# Patient Record
Sex: FEMALE | Race: WHITE | NOT HISPANIC OR LATINO | Employment: UNEMPLOYED | URBAN - NONMETROPOLITAN AREA
[De-identification: names, ages, dates, MRNs, and addresses within clinical notes are randomized per-mention and may not be internally consistent; named-entity substitution may affect disease eponyms.]

---

## 2023-09-18 LAB
ALANINE AMINOTRANSFERASE (SGPT) (U/L) IN SER/PLAS: 24 U/L (ref 7–45)
ALBUMIN (G/DL) IN SER/PLAS: 4 G/DL (ref 3.4–5)
ALKALINE PHOSPHATASE (U/L) IN SER/PLAS: 54 U/L (ref 33–110)
ANION GAP IN SER/PLAS: 17 MMOL/L (ref 10–20)
ASPARTATE AMINOTRANSFERASE (SGOT) (U/L) IN SER/PLAS: 23 U/L (ref 9–39)
BILIRUBIN TOTAL (MG/DL) IN SER/PLAS: 0.3 MG/DL (ref 0–1.2)
CALCIUM (MG/DL) IN SER/PLAS: 9.1 MG/DL (ref 8.6–10.3)
CARBON DIOXIDE, TOTAL (MMOL/L) IN SER/PLAS: 25 MMOL/L (ref 21–32)
CHLORIDE (MMOL/L) IN SER/PLAS: 100 MMOL/L (ref 98–107)
CHOLESTEROL (MG/DL) IN SER/PLAS: 151 MG/DL (ref 0–199)
CHOLESTEROL IN HDL (MG/DL) IN SER/PLAS: 45 MG/DL
CHOLESTEROL/HDL RATIO: 3.4
CREATININE (MG/DL) IN SER/PLAS: 0.46 MG/DL (ref 0.5–1.05)
ERYTHROCYTE DISTRIBUTION WIDTH (RATIO) BY AUTOMATED COUNT: 14.2 % (ref 11.5–14.5)
ERYTHROCYTE MEAN CORPUSCULAR HEMOGLOBIN CONCENTRATION (G/DL) BY AUTOMATED: 30.3 G/DL (ref 32–36)
ERYTHROCYTE MEAN CORPUSCULAR VOLUME (FL) BY AUTOMATED COUNT: 83 FL (ref 80–100)
ERYTHROCYTES (10*6/UL) IN BLOOD BY AUTOMATED COUNT: 4.55 X10E12/L (ref 4–5.2)
ESTIMATED AVERAGE GLUCOSE FOR HBA1C: 143 MG/DL
GFR FEMALE: >90 ML/MIN/1.73M2
GLUCOSE (MG/DL) IN SER/PLAS: 154 MG/DL (ref 74–99)
HEMATOCRIT (%) IN BLOOD BY AUTOMATED COUNT: 37.6 % (ref 36–46)
HEMOGLOBIN (G/DL) IN BLOOD: 11.4 G/DL (ref 12–16)
HEMOGLOBIN A1C/HEMOGLOBIN TOTAL IN BLOOD: 6.6 %
HEPATITIS C VIRUS AB PRESENCE IN SERUM: NONREACTIVE
HIV 1/ 2 AG/AB SCREEN: NONREACTIVE
LDL: 77 MG/DL (ref 0–99)
LEUKOCYTES (10*3/UL) IN BLOOD BY AUTOMATED COUNT: 10.7 X10E9/L (ref 4.4–11.3)
MAGNESIUM (MG/DL) IN SER/PLAS: 1.95 MG/DL (ref 1.6–2.4)
PHOSPHATE (MG/DL) IN SER/PLAS: 4.3 MG/DL (ref 2.5–4.9)
PLATELETS (10*3/UL) IN BLOOD AUTOMATED COUNT: 385 X10E9/L (ref 150–450)
POTASSIUM (MMOL/L) IN SER/PLAS: 4.1 MMOL/L (ref 3.5–5.3)
PROTEIN TOTAL: 7 G/DL (ref 6.4–8.2)
SODIUM (MMOL/L) IN SER/PLAS: 138 MMOL/L (ref 136–145)
THYROTROPIN (MIU/L) IN SER/PLAS BY DETECTION LIMIT <= 0.05 MIU/L: 0.02 MIU/L (ref 0.44–3.98)
THYROXINE (T4) FREE (NG/DL) IN SER/PLAS: 0.81 NG/DL (ref 0.61–1.12)
TRIGLYCERIDE (MG/DL) IN SER/PLAS: 146 MG/DL (ref 0–149)
TRIIODOTHYRONINE (T3) (NG/DL) IN SER/PLAS: 194 NG/DL (ref 60–200)
UREA NITROGEN (MG/DL) IN SER/PLAS: 11 MG/DL (ref 6–23)
VLDL: 29 MG/DL (ref 0–40)

## 2023-10-02 ENCOUNTER — HOSPITAL ENCOUNTER (OUTPATIENT)
Dept: RADIOLOGY | Facility: HOSPITAL | Age: 43
Discharge: HOME | End: 2023-10-02
Payer: COMMERCIAL

## 2023-10-02 DIAGNOSIS — R59.0 LOCALIZED ENLARGED LYMPH NODES: ICD-10-CM

## 2023-10-02 PROCEDURE — 70491 CT SOFT TISSUE NECK W/DYE: CPT

## 2023-10-02 PROCEDURE — 70491 CT SOFT TISSUE NECK W/DYE: CPT | Performed by: RADIOLOGY

## 2023-10-02 PROCEDURE — 2550000001 HC RX 255 CONTRASTS: Performed by: RADIOLOGY

## 2023-10-02 RX ADMIN — IOHEXOL 90 ML: 350 INJECTION, SOLUTION INTRAVENOUS at 08:47

## 2023-10-16 ENCOUNTER — HOSPITAL ENCOUNTER (EMERGENCY)
Facility: HOSPITAL | Age: 43
Discharge: HOME | End: 2023-10-16
Attending: EMERGENCY MEDICINE
Payer: COMMERCIAL

## 2023-10-16 VITALS
BODY MASS INDEX: 38.14 KG/M2 | HEIGHT: 67 IN | WEIGHT: 243 LBS | TEMPERATURE: 97.9 F | DIASTOLIC BLOOD PRESSURE: 91 MMHG | RESPIRATION RATE: 19 BRPM | HEART RATE: 75 BPM | SYSTOLIC BLOOD PRESSURE: 161 MMHG | OXYGEN SATURATION: 98 %

## 2023-10-16 DIAGNOSIS — F31.9 BIPOLAR 1 DISORDER, DEPRESSED (MULTI): Primary | ICD-10-CM

## 2023-10-16 LAB
ALBUMIN SERPL BCP-MCNC: 4.1 G/DL (ref 3.4–5)
ALP SERPL-CCNC: 63 U/L (ref 33–110)
ALT SERPL W P-5'-P-CCNC: 16 U/L (ref 7–45)
AMPHETAMINES UR QL SCN: ABNORMAL
ANION GAP SERPL CALC-SCNC: 12 MMOL/L (ref 10–20)
APAP SERPL-MCNC: <10 UG/ML
AST SERPL W P-5'-P-CCNC: 14 U/L (ref 9–39)
BARBITURATES UR QL SCN: ABNORMAL
BASOPHILS # BLD AUTO: 0.04 X10*3/UL (ref 0–0.1)
BASOPHILS NFR BLD AUTO: 0.4 %
BENZODIAZ UR QL SCN: ABNORMAL
BILIRUB SERPL-MCNC: 0.2 MG/DL (ref 0–1.2)
BUN SERPL-MCNC: 8 MG/DL (ref 6–23)
BZE UR QL SCN: ABNORMAL
CALCIUM SERPL-MCNC: 9.3 MG/DL (ref 8.6–10.3)
CANNABINOIDS UR QL SCN: ABNORMAL
CHLORIDE SERPL-SCNC: 100 MMOL/L (ref 98–107)
CO2 SERPL-SCNC: 29 MMOL/L (ref 21–32)
CREAT SERPL-MCNC: 0.48 MG/DL (ref 0.5–1.05)
EOSINOPHIL # BLD AUTO: 0.1 X10*3/UL (ref 0–0.7)
EOSINOPHIL NFR BLD AUTO: 1 %
ERYTHROCYTE [DISTWIDTH] IN BLOOD BY AUTOMATED COUNT: 13.7 % (ref 11.5–14.5)
ETHANOL SERPL-MCNC: <10 MG/DL
FENTANYL+NORFENTANYL UR QL SCN: ABNORMAL
GFR SERPL CREATININE-BSD FRML MDRD: >90 ML/MIN/1.73M*2
GLUCOSE SERPL-MCNC: 103 MG/DL (ref 74–99)
HCG UR QL IA.RAPID: NEGATIVE
HCT VFR BLD AUTO: 37.2 % (ref 36–46)
HGB BLD-MCNC: 11.4 G/DL (ref 12–16)
IMM GRANULOCYTES # BLD AUTO: 0.02 X10*3/UL (ref 0–0.7)
IMM GRANULOCYTES NFR BLD AUTO: 0.2 % (ref 0–0.9)
LYMPHOCYTES # BLD AUTO: 2.32 X10*3/UL (ref 1.2–4.8)
LYMPHOCYTES NFR BLD AUTO: 24.2 %
MCH RBC QN AUTO: 24.6 PG (ref 26–34)
MCHC RBC AUTO-ENTMCNC: 30.6 G/DL (ref 32–36)
MCV RBC AUTO: 80 FL (ref 80–100)
MONOCYTES # BLD AUTO: 0.58 X10*3/UL (ref 0.1–1)
MONOCYTES NFR BLD AUTO: 6 %
NEUTROPHILS # BLD AUTO: 6.54 X10*3/UL (ref 1.2–7.7)
NEUTROPHILS NFR BLD AUTO: 68.2 %
NRBC BLD-RTO: 0 /100 WBCS (ref 0–0)
OPIATES UR QL SCN: ABNORMAL
OXYCODONE+OXYMORPHONE UR QL SCN: ABNORMAL
PCP UR QL SCN: ABNORMAL
PLATELET # BLD AUTO: 364 X10*3/UL (ref 150–450)
PMV BLD AUTO: 8.1 FL (ref 7.5–11.5)
POTASSIUM SERPL-SCNC: 4 MMOL/L (ref 3.5–5.3)
PROT SERPL-MCNC: 7.3 G/DL (ref 6.4–8.2)
RBC # BLD AUTO: 4.63 X10*6/UL (ref 4–5.2)
SALICYLATES SERPL-MCNC: <3 MG/DL
SODIUM SERPL-SCNC: 137 MMOL/L (ref 136–145)
WBC # BLD AUTO: 9.6 X10*3/UL (ref 4.4–11.3)

## 2023-10-16 PROCEDURE — 85025 COMPLETE CBC W/AUTO DIFF WBC: CPT | Performed by: EMERGENCY MEDICINE

## 2023-10-16 PROCEDURE — 80329 ANALGESICS NON-OPIOID 1 OR 2: CPT | Performed by: EMERGENCY MEDICINE

## 2023-10-16 PROCEDURE — 99284 EMERGENCY DEPT VISIT MOD MDM: CPT | Performed by: EMERGENCY MEDICINE

## 2023-10-16 PROCEDURE — 81025 URINE PREGNANCY TEST: CPT | Performed by: EMERGENCY MEDICINE

## 2023-10-16 PROCEDURE — 36415 COLL VENOUS BLD VENIPUNCTURE: CPT | Performed by: EMERGENCY MEDICINE

## 2023-10-16 PROCEDURE — 80349 CANNABINOIDS NATURAL: CPT | Performed by: EMERGENCY MEDICINE

## 2023-10-16 PROCEDURE — 80307 DRUG TEST PRSMV CHEM ANLYZR: CPT | Performed by: EMERGENCY MEDICINE

## 2023-10-16 PROCEDURE — 80053 COMPREHEN METABOLIC PANEL: CPT | Performed by: EMERGENCY MEDICINE

## 2023-10-16 PROCEDURE — 2500000001 HC RX 250 WO HCPCS SELF ADMINISTERED DRUGS (ALT 637 FOR MEDICARE OP): Performed by: EMERGENCY MEDICINE

## 2023-10-16 RX ORDER — LORAZEPAM 1 MG/1
1 TABLET ORAL ONCE
Status: COMPLETED | OUTPATIENT
Start: 2023-10-16 | End: 2023-10-16

## 2023-10-16 RX ADMIN — LORAZEPAM 1 MG: 1 TABLET ORAL at 20:43

## 2023-10-16 SDOH — HEALTH STABILITY: MENTAL HEALTH: HALLUCINATION: AUDITORY;VISUAL

## 2023-10-16 SDOH — HEALTH STABILITY: MENTAL HEALTH: BEHAVIORS/MOOD: COOPERATIVE;HALLUCINATIONS;SAD

## 2023-10-16 SDOH — HEALTH STABILITY: MENTAL HEALTH: SUICIDE ASSESSMENT: ADULT (C-SSRS)

## 2023-10-16 ASSESSMENT — COLUMBIA-SUICIDE SEVERITY RATING SCALE - C-SSRS
2. HAVE YOU ACTUALLY HAD ANY THOUGHTS OF KILLING YOURSELF?: YES
6. HAVE YOU EVER DONE ANYTHING, STARTED TO DO ANYTHING, OR PREPARED TO DO ANYTHING TO END YOUR LIFE?: YES
4. HAVE YOU HAD THESE THOUGHTS AND HAD SOME INTENTION OF ACTING ON THEM?: NO
1. IN THE PAST MONTH, HAVE YOU WISHED YOU WERE DEAD OR WISHED YOU COULD GO TO SLEEP AND NOT WAKE UP?: NO
5. HAVE YOU STARTED TO WORK OUT OR WORKED OUT THE DETAILS OF HOW TO KILL YOURSELF? DO YOU INTEND TO CARRY OUT THIS PLAN?: NO
6. HAVE YOU EVER DONE ANYTHING, STARTED TO DO ANYTHING, OR PREPARED TO DO ANYTHING TO END YOUR LIFE?: NO

## 2023-10-16 ASSESSMENT — PAIN - FUNCTIONAL ASSESSMENT: PAIN_FUNCTIONAL_ASSESSMENT: 0-10

## 2023-10-16 ASSESSMENT — PAIN SCALES - GENERAL: PAINLEVEL_OUTOF10: 8

## 2023-10-16 NOTE — ED PROVIDER NOTES
HPI   Chief Complaint   Patient presents with    Psychiatric Evaluation     Started ATB 4 days ago and increased anti-depressant. 3 days ago she started having hallucinations and hearing voices that were telling her to hurt herself.  Reports increased panic attacks and tremors.       Limitations to History: None    HPI: 43-year-old female presents with concern for auditory hallucinations.  States that some of these hallucinations are telling her to hurt herself.  States that she did recently have her antidepressant Pristiq increased approximately week and a half ago.  Patient also began antibiotic therapy with clindamycin for a dental infection 4 days ago.  Denies any current attempt.  Has no plan.  Denies any other complaints.    Additional History Obtained from: Friend at the bedside.    ------------------------------------------------------------------------------------------------------------------------------------------  Physical Exam:    VS: As documented in the triage note and EMR flowsheet from this visit were reviewed.    Appearance: Alert. cooperative,  in no acute distress.   Skin: Intact,  dry skin, no lesions, rash, petechiae or purpura.   Eyes: PERRLA, EOMs intact,  Conjunctiva pink with no redness or exudates.   HENT: Normocephalic, atraumatic. Nares patent. No intraoral lesions.   Neck: Supple, without meningismus. Trachea at midline. No lymphadenopathy.  Pulmonary: Clear bilaterally with good chest wall excursion. No rales, rhonchi or wheezing. No accessory muscle use or stridor.  Cardiac: Regular rate and rhythm, no rubs, murmurs, or gallops.   Abdomen: Abdomen is soft, nontender, and nondistended.  No palpable organomegaly.  No rebound or guarding.  No CVA tenderness. Nonsurgical abdomen  Genitourinary: Exam deferred.  Musculoskeletal: Full range of motion.  Pulses full and equal. No cyanosis, clubbing, or edema.  Neurological:  Cranial nerves are grossly intact, grossly normal sensation, no  weakness, no focal findings identified.  Psychiatric: Appropriate mood and affect.                            No data recorded                Patient History   Past Medical History:   Diagnosis Date    Anxiety     Depression     Diabetes mellitus (CMS/HCC)     Disease of thyroid gland      History reviewed. No pertinent surgical history.  No family history on file.  Social History     Tobacco Use    Smoking status: Never    Smokeless tobacco: Never   Vaping Use    Vaping Use: Every day   Substance Use Topics    Alcohol use: Not Currently     Comment: rarely    Drug use: Yes     Types: Marijuana       Physical Exam   ED Triage Vitals [10/16/23 1823]   Temp Heart Rate Resp BP   36.6 °C (97.9 °F) 77 18 158/84      SpO2 Temp Source Heart Rate Source Patient Position   98 % Tympanic -- --      BP Location FiO2 (%)     -- --       Physical Exam    ED Course & MDM        Medical Decision Making  Medical Decision Making:    Patient appears well nontoxic.  Patient will be medically cleared and then evaluated by crisis counselor.  Patient signed out to incoming physician Dr. Jiang in stable condition.    Escalation of Care:  Appropriate for handoff to incoming emergency physician.            Procedure  Procedures     Vinay Leos DO  10/16/23 1820

## 2023-10-17 NOTE — ED PROVIDER NOTES
Emergency Medicine Transition of Care Note.    I received Maddison Galdamez in signout from Dr. Domínguez.  Please see the previous ED provider note for all HPI, PE and MDM up to the time of signout at 7 PM. This is in addition to the primary record.    In brief Maddison Galdamez is an 43 y.o. female presenting for   Chief Complaint   Patient presents with    Psychiatric Evaluation     Started ATB 4 days ago and increased anti-depressant. 3 days ago she started having hallucinations and hearing voices that were telling her to hurt herself.  Reports increased panic attacks and tremors.     At the time of signout we were awaiting: Final disposition by [facility]    Diagnoses as of 10/16/23 2130   Bipolar 1 disorder, depressed (CMS/HCC)       Medical Decision Making  This patient was checked out to me pending final disposition after evaluation by [facility].  They feel comfortable with her going home.  They have developed a safety plan.  She is going to follow-up with her current counselor in the next 1 to 2 days.        Final diagnoses:   [F31.9] Bipolar 1 disorder, depressed (CMS/HCC)           Procedure  Procedures    MD Star Shabazz MD  10/16/23 2130

## 2023-10-30 PROBLEM — E11.9 DIABETES MELLITUS (MULTI): Status: ACTIVE | Noted: 2023-09-24

## 2023-10-30 PROBLEM — R09.A2 SENSATION OF FOREIGN BODY IN THROAT: Status: ACTIVE | Noted: 2019-09-09

## 2023-10-30 PROBLEM — F43.10 PTSD (POST-TRAUMATIC STRESS DISORDER): Status: ACTIVE | Noted: 2023-09-13

## 2023-10-30 PROBLEM — E89.0 POST-SURGICAL HYPOTHYROIDISM: Status: ACTIVE | Noted: 2023-09-24

## 2023-10-30 PROBLEM — H34.213: Status: ACTIVE | Noted: 2023-09-24

## 2023-10-30 PROBLEM — Z90.710 H/O: HYSTERECTOMY: Status: ACTIVE | Noted: 2020-01-01

## 2023-10-30 PROBLEM — R20.0 NUMBNESS AND TINGLING OF BOTH LEGS: Status: ACTIVE | Noted: 2023-09-24

## 2023-10-30 PROBLEM — E89.0 H/O TOTAL THYROIDECTOMY: Status: ACTIVE | Noted: 2019-11-18

## 2023-10-30 PROBLEM — G47.00 INSOMNIA: Status: ACTIVE | Noted: 2023-09-24

## 2023-10-30 PROBLEM — R49.0 HOARSENESS: Status: ACTIVE | Noted: 2019-09-09

## 2023-10-30 PROBLEM — E61.2 MAGNESIUM DEFICIENCY: Status: ACTIVE | Noted: 2023-09-24

## 2023-10-30 PROBLEM — E04.1 THYROID NODULE: Status: ACTIVE | Noted: 2019-09-09

## 2023-10-30 PROBLEM — F31.9 BIPOLAR 1 DISORDER (MULTI): Status: ACTIVE | Noted: 2023-09-13

## 2023-10-30 PROBLEM — R20.2 NUMBNESS AND TINGLING OF BOTH LEGS: Status: ACTIVE | Noted: 2023-09-24

## 2023-10-30 PROBLEM — J45.20 MILD INTERMITTENT ASTHMA WITHOUT COMPLICATION (HHS-HCC): Status: ACTIVE | Noted: 2022-03-08

## 2023-10-30 PROBLEM — E20.9 HYPOPARATHYROIDISM (MULTI): Status: ACTIVE | Noted: 2021-12-11

## 2023-10-30 PROBLEM — E55.9 VITAMIN D2 DEFICIENCY: Status: ACTIVE | Noted: 2023-09-24

## 2023-10-30 PROBLEM — K21.9 GASTROESOPHAGEAL REFLUX DISEASE WITHOUT ESOPHAGITIS: Status: ACTIVE | Noted: 2019-09-09

## 2023-10-30 PROBLEM — F41.9 ANXIETY: Status: ACTIVE | Noted: 2023-09-13

## 2023-10-30 PROBLEM — E83.51 HYPOCALCEMIA: Status: ACTIVE | Noted: 2021-06-06

## 2023-10-30 PROBLEM — E78.00 PURE HYPERCHOLESTEROLEMIA: Status: ACTIVE | Noted: 2023-09-24

## 2023-10-30 PROBLEM — F31.0 BIPOLAR I DISORDER, MOST RECENT EPISODE HYPOMANIC (MULTI): Status: ACTIVE | Noted: 2017-10-09

## 2023-10-30 PROBLEM — F32.A DEPRESSION: Status: ACTIVE | Noted: 2023-09-13

## 2023-10-30 RX ORDER — GLIPIZIDE 10 MG/1
1 TABLET, FILM COATED, EXTENDED RELEASE ORAL DAILY
COMMUNITY
Start: 2023-09-05

## 2023-10-30 RX ORDER — PRAZOSIN HYDROCHLORIDE 1 MG/1
2 CAPSULE ORAL NIGHTLY
COMMUNITY

## 2023-10-30 RX ORDER — LIOTHYRONINE SODIUM 25 UG/1
25 TABLET ORAL DAILY
COMMUNITY

## 2023-10-30 RX ORDER — MONTELUKAST SODIUM 10 MG/1
10 TABLET ORAL
COMMUNITY
End: 2023-11-21 | Stop reason: WASHOUT

## 2023-10-30 RX ORDER — CLONAZEPAM 1 MG/1
TABLET ORAL 2 TIMES DAILY PRN
COMMUNITY

## 2023-10-30 RX ORDER — FERROUS SULFATE 325(65) MG
325 TABLET ORAL
COMMUNITY
End: 2023-11-21 | Stop reason: WASHOUT

## 2023-10-30 RX ORDER — RISPERIDONE 1 MG/1
1 TABLET, ORALLY DISINTEGRATING ORAL
COMMUNITY
End: 2023-11-21 | Stop reason: WASHOUT

## 2023-10-30 RX ORDER — LURASIDONE HYDROCHLORIDE 40 MG/1
40 TABLET, FILM COATED ORAL
COMMUNITY

## 2023-10-30 RX ORDER — HYDROXYZINE HYDROCHLORIDE 25 MG/1
25 TABLET, FILM COATED ORAL 3 TIMES DAILY PRN
COMMUNITY

## 2023-10-30 RX ORDER — CELECOXIB 100 MG/1
100 CAPSULE ORAL 2 TIMES DAILY
COMMUNITY
Start: 2023-10-08 | End: 2024-01-06

## 2023-10-30 RX ORDER — DOCUSATE SODIUM 100 MG/1
100 CAPSULE, LIQUID FILLED ORAL 2 TIMES DAILY
COMMUNITY
End: 2023-11-21 | Stop reason: WASHOUT

## 2023-10-30 RX ORDER — CALCIUM ACETATE 667 MG/1
CAPSULE ORAL
COMMUNITY
Start: 2019-12-23 | End: 2023-11-21 | Stop reason: WASHOUT

## 2023-10-30 RX ORDER — SIMVASTATIN 40 MG/1
1 TABLET, FILM COATED ORAL NIGHTLY
COMMUNITY
Start: 2023-09-04

## 2023-10-30 RX ORDER — PANTOPRAZOLE SODIUM 40 MG/1
40 TABLET, DELAYED RELEASE ORAL 2 TIMES DAILY
COMMUNITY
End: 2023-11-21 | Stop reason: WASHOUT

## 2023-10-30 RX ORDER — VILAZODONE HYDROCHLORIDE 20 MG/1
TABLET ORAL DAILY
COMMUNITY
End: 2023-11-21 | Stop reason: WASHOUT

## 2023-10-30 RX ORDER — DESVENLAFAXINE SUCCINATE 50 MG/1
TABLET, EXTENDED RELEASE ORAL
COMMUNITY
Start: 2023-09-06 | End: 2023-11-21 | Stop reason: WASHOUT

## 2023-10-30 RX ORDER — LISINOPRIL 10 MG/1
10 TABLET ORAL DAILY
COMMUNITY
Start: 2023-02-22 | End: 2023-11-21 | Stop reason: WASHOUT

## 2023-10-30 RX ORDER — FERROUS SULFATE, DRIED 160(50) MG
1 TABLET, EXTENDED RELEASE ORAL 2 TIMES WEEKLY
COMMUNITY
Start: 2019-12-11

## 2023-10-30 RX ORDER — CETIRIZINE HYDROCHLORIDE 10 MG/1
10 TABLET ORAL
COMMUNITY

## 2023-10-30 RX ORDER — LORATADINE 10 MG/1
1 TABLET ORAL DAILY
COMMUNITY

## 2023-10-30 RX ORDER — ALBUTEROL SULFATE 90 UG/1
2 AEROSOL, METERED RESPIRATORY (INHALATION) EVERY 6 HOURS PRN
COMMUNITY
Start: 2017-12-22

## 2023-10-30 RX ORDER — LEVOTHYROXINE SODIUM 112 UG/1
112 TABLET ORAL
COMMUNITY
Start: 2023-06-11 | End: 2023-11-21 | Stop reason: WASHOUT

## 2023-10-30 RX ORDER — LITHIUM CARBONATE 300 MG
300 TABLET ORAL 2 TIMES DAILY
COMMUNITY
End: 2023-11-21 | Stop reason: WASHOUT

## 2023-10-30 RX ORDER — CALCIUM CARBONATE/VITAMIN D3 500-10/5ML
1 LIQUID (ML) ORAL DAILY
COMMUNITY
Start: 2023-07-04

## 2023-10-30 RX ORDER — DIVALPROEX SODIUM 125 MG/1
125 TABLET, DELAYED RELEASE ORAL
COMMUNITY
End: 2023-11-21 | Stop reason: WASHOUT

## 2023-10-30 RX ORDER — LEVOTHYROXINE SODIUM 125 UG/1
1 TABLET ORAL EVERY EVENING
COMMUNITY
Start: 2023-09-09

## 2023-10-30 RX ORDER — HYDROXYZINE PAMOATE 25 MG/1
25 CAPSULE ORAL 3 TIMES DAILY PRN
COMMUNITY

## 2023-10-30 RX ORDER — DESVENLAFAXINE 100 MG/1
100 TABLET, EXTENDED RELEASE ORAL DAILY
COMMUNITY
Start: 2023-09-26 | End: 2023-11-21 | Stop reason: WASHOUT

## 2023-10-30 RX ORDER — DULOXETIN HYDROCHLORIDE 30 MG/1
30 CAPSULE, DELAYED RELEASE ORAL DAILY
COMMUNITY
Start: 2023-01-18 | End: 2023-11-21 | Stop reason: WASHOUT

## 2023-10-30 RX ORDER — VENLAFAXINE HYDROCHLORIDE 150 MG/1
150 CAPSULE, EXTENDED RELEASE ORAL DAILY
COMMUNITY
Start: 2023-07-12 | End: 2023-11-21 | Stop reason: WASHOUT

## 2023-10-30 RX ORDER — ONDANSETRON HYDROCHLORIDE 8 MG/1
4 TABLET, FILM COATED ORAL EVERY 8 HOURS PRN
COMMUNITY
Start: 2018-05-07

## 2023-10-30 RX ORDER — SIMVASTATIN 20 MG/1
20 TABLET, FILM COATED ORAL NIGHTLY
COMMUNITY
Start: 2023-06-29 | End: 2023-11-21 | Stop reason: WASHOUT

## 2023-10-30 RX ORDER — METFORMIN HYDROCHLORIDE 500 MG/1
500 TABLET ORAL
COMMUNITY

## 2023-10-30 RX ORDER — ERGOCALCIFEROL 1.25 MG/1
1 CAPSULE ORAL WEEKLY
COMMUNITY
Start: 2023-09-02

## 2023-10-30 RX ORDER — METOPROLOL SUCCINATE 25 MG/1
25 TABLET, EXTENDED RELEASE ORAL
COMMUNITY
End: 2023-11-21 | Stop reason: WASHOUT

## 2023-10-30 RX ORDER — MELOXICAM 7.5 MG/1
7.5 TABLET ORAL
COMMUNITY
End: 2023-11-21 | Stop reason: WASHOUT

## 2023-10-30 RX ORDER — ATORVASTATIN CALCIUM 40 MG/1
40 TABLET, FILM COATED ORAL NIGHTLY
COMMUNITY
Start: 2018-04-24 | End: 2023-11-21 | Stop reason: WASHOUT

## 2023-10-30 RX ORDER — DICYCLOMINE HYDROCHLORIDE 20 MG/1
20 TABLET ORAL EVERY 6 HOURS
COMMUNITY
End: 2023-11-21 | Stop reason: WASHOUT

## 2023-10-30 RX ORDER — VENLAFAXINE HYDROCHLORIDE 37.5 MG/1
3 CAPSULE, EXTENDED RELEASE ORAL DAILY
COMMUNITY
Start: 2023-06-19 | End: 2023-11-21 | Stop reason: WASHOUT

## 2023-10-30 RX ORDER — ONDANSETRON 4 MG/1
4 TABLET, ORALLY DISINTEGRATING ORAL EVERY 12 HOURS PRN
COMMUNITY
Start: 2023-09-13

## 2023-10-30 RX ORDER — HYDROCORTISONE 1 %
CREAM (GRAM) TOPICAL
COMMUNITY
Start: 2014-05-24

## 2023-10-30 RX ORDER — AMITRIPTYLINE HYDROCHLORIDE 50 MG/1
1 TABLET, FILM COATED ORAL NIGHTLY
COMMUNITY
End: 2023-11-21 | Stop reason: WASHOUT

## 2023-10-30 RX ORDER — ONDANSETRON 4 MG/1
8 TABLET, FILM COATED ORAL EVERY 8 HOURS PRN
COMMUNITY
Start: 2023-08-29

## 2023-10-30 RX ORDER — ZOLPIDEM TARTRATE 10 MG/1
TABLET ORAL NIGHTLY PRN
COMMUNITY

## 2023-10-30 RX ORDER — LITHIUM CARBONATE 150 MG/1
150 CAPSULE ORAL 2 TIMES DAILY
COMMUNITY
End: 2023-11-21 | Stop reason: WASHOUT

## 2023-10-30 RX ORDER — TRIAMCINOLONE ACETONIDE 1 MG/G
CREAM TOPICAL
COMMUNITY
Start: 2023-06-02

## 2023-10-30 RX ORDER — LEVOTHYROXINE SODIUM 88 UG/1
88 TABLET ORAL
COMMUNITY
Start: 2019-12-04 | End: 2023-11-21 | Stop reason: WASHOUT

## 2023-10-31 ENCOUNTER — ANCILLARY PROCEDURE (OUTPATIENT)
Dept: RADIOLOGY | Facility: CLINIC | Age: 43
End: 2023-10-31
Payer: COMMERCIAL

## 2023-10-31 ENCOUNTER — OFFICE VISIT (OUTPATIENT)
Dept: ORTHOPEDIC SURGERY | Facility: CLINIC | Age: 43
End: 2023-10-31
Payer: COMMERCIAL

## 2023-10-31 DIAGNOSIS — M25.562 LEFT KNEE PAIN, UNSPECIFIED CHRONICITY: ICD-10-CM

## 2023-10-31 DIAGNOSIS — M17.12 ARTHRITIS OF KNEE, LEFT: Primary | ICD-10-CM

## 2023-10-31 PROCEDURE — 99204 OFFICE O/P NEW MOD 45 MIN: CPT | Performed by: SPECIALIST

## 2023-10-31 PROCEDURE — 4010F ACE/ARB THERAPY RXD/TAKEN: CPT | Performed by: SPECIALIST

## 2023-10-31 PROCEDURE — 1036F TOBACCO NON-USER: CPT | Performed by: SPECIALIST

## 2023-10-31 PROCEDURE — 3044F HG A1C LEVEL LT 7.0%: CPT | Performed by: SPECIALIST

## 2023-10-31 PROCEDURE — 20611 DRAIN/INJ JOINT/BURSA W/US: CPT | Performed by: SPECIALIST

## 2023-10-31 PROCEDURE — 73562 X-RAY EXAM OF KNEE 3: CPT | Mod: LT,FY

## 2023-10-31 PROCEDURE — 73562 X-RAY EXAM OF KNEE 3: CPT | Mod: LEFT SIDE | Performed by: RADIOLOGY

## 2023-10-31 RX ORDER — DICLOFENAC SODIUM 10 MG/G
4 GEL TOPICAL 4 TIMES DAILY PRN
Qty: 100 G | Refills: 1 | Status: SHIPPED | OUTPATIENT
Start: 2023-10-31

## 2023-10-31 RX ORDER — CLINDAMYCIN HYDROCHLORIDE 150 MG/1
CAPSULE ORAL
COMMUNITY
Start: 2023-10-09

## 2023-10-31 RX ORDER — AZITHROMYCIN 250 MG/1
TABLET, FILM COATED ORAL
COMMUNITY
Start: 2023-10-16

## 2023-10-31 RX ORDER — TRIAMCINOLONE ACETONIDE 40 MG/ML
2.5 INJECTION, SUSPENSION INTRA-ARTICULAR; INTRAMUSCULAR
Status: COMPLETED | OUTPATIENT
Start: 2023-10-31 | End: 2023-10-31

## 2023-10-31 RX ADMIN — TRIAMCINOLONE ACETONIDE 2.5 MG: 40 INJECTION, SUSPENSION INTRA-ARTICULAR; INTRAMUSCULAR at 09:45

## 2023-10-31 ASSESSMENT — PAIN SCALES - GENERAL: PAINLEVEL_OUTOF10: 7

## 2023-10-31 ASSESSMENT — PAIN - FUNCTIONAL ASSESSMENT: PAIN_FUNCTIONAL_ASSESSMENT: 0-10

## 2023-10-31 ASSESSMENT — PAIN DESCRIPTION - DESCRIPTORS: DESCRIPTORS: SHARP;CRAMPING

## 2023-10-31 NOTE — ASSESSMENT & PLAN NOTE
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  She previously had a patellofemoral realignment with a Daniela type osteotomy.  She has 2 screws in her tibia approximately which are not bothersome.    Plan:  She is diabetic but controls her sugars they are usually below 130.  I discussed cortisone injection with her and she is willing to go forward with this as a treatment option.  We discussed weight loss and she is working at this.  She is going on disability because of the knee but for other reasons as well.  So she is not working at this time.  We could consider bracing.  Physical therapy for gentle range of motion and strengthening and arthritic exercise program  She is allergic to aspirin it causes her hives but she has use Voltaren and we prescribed this for her today.  Follow-up in 4-6 weeks for reevaluation.

## 2023-10-31 NOTE — PROGRESS NOTES
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  She previously had a patellofemoral realignment with a Daniela type osteotomy.  She has 2 screws in her tibia approximately which are not bothersome.    Plan:  She is diabetic but controls her sugars they are usually below 130.  I discussed cortisone injection with her and she is willing to go forward with this as a treatment option.  We discussed weight loss and she is working at this.  She is going on disability because of the knee but for other reasons as well.  So she is not working at this time.  We could consider bracing.  Physical therapy for gentle range of motion and strengthening and arthritic exercise program  She is allergic to aspirin it causes her hives but she has use Voltaren and we prescribed this for her today.  Follow-up in 4-6 weeks for reevaluation.       Subjective    Patient ID: Maddison Galdamez is a 43 y.o. female.    Chief Complaint: Pain of the Left Knee (Patient states she has been having the pain in her left knee since 2005. It is just progressively getting worse.   X-ray's done just before this appointment. Patient states she has had 2 previous knee surgeries on this knee.  Last one done in 2007./)     Last Surgery: No surgery found  Last Surgery Date: No surgery found    HPI  43-year-old female who is complaining of valgus knee arthritis.  She comes in today for treatment plan.  We discussed the risks and benefits of various treatment options including cortisone injections all the way to total knee replacement.  She wishes to go forward with a cortisone today.    OBJECTIVE: ORTHO EXAM    Left knee Exam    Appears healthy and in no acute distress.     Examination of the knee reveals the following:    Inspection: In the standing position, knee alignment is normal.   There is no  muscle atrophy around the knee.   Walking gait is antalgic with a varus thrust.     In the supine position, there is no obvious leg length discrepancy or difference in alignment.     Palpation:  No calor- warmth  No tumor- soft tissue swelling  Mild effusion.  No ecchymosis.    Dulor- pain:  Moderate tenderness to the lateral joint line.  Mild tenderness to the medial joint line.  Mild pain with patellofemoral compression.    Neurovascular:  Dorsalis pedis pulse is palpable with brisk capillary refill.  Sensation is intact over the lower leg, dorsum of the foot, plantar aspect  and first web space.   The calves are non-tender to palpation bilaterally.    Range of Motion:   Active knee extention is  0 degrees and is painless.  Active knee flexion is >125 degrees and is painless.  Active ankle range of motion is full.   Full passive internal and external rotation of the hip does not cause any pain.  Normal flexibility in the hamstrings, quadriceps and heel cords.    Strength Testing:     Hip flexors, hip extensors, abductors, adductors, knee flexors, knee extensors, ankle dorsiflexor and ankle plantar flexor strength are all 5/5.    Stability and Provocative Testing:   Medial Collateral Ligament: There is no laxity or pain with valgus stress testing at 0 and 15 degrees of knee flexion.  Lateral Collateral Ligament: There is no laxity or pain with varus stress testing at 0 and 15 degrees of knee flexion.  Anterior Cruciate Ligament: Anterior Drawer reveals a firm endpoint and no laxity.  Anterior Cruciate Ligament: Lachman's reveals a firm endpoint and no laxity.  Posterior Cruciate Ligament: There is no posterior translation of the tibia plateau.     Medial and Lateral Menisci: Travis's test is positive.    Hip- Range of motion and cursory exam was non-tender and not provacative.      IMAGE RESULTS:  CT soft tissue neck w IV contrast  Narrative: Interpreted By:  Hema Mathis and Liller Gregory   STUDY:  CT  SOFT TISSUE NECK W IV CONTRAST;  10/2/2023 8:49 am      INDICATION:  Signs/Symptoms:LUMP IN  NECK.      COMPARISON:  None.      ACCESSION NUMBER(S):  [id number]      ORDERING CLINICIAN:  MERCED PEREZ      TECHNIQUE:  Axial CT images of the neck were obtained.  The patient received 90  mL Omnipaque 350 intravenous contrast agent. The images were  reformatted in angled axial, coronal and sagittal planes.      FINDINGS:  Oral Cavity, Pharynx and Larynx:  Evaluation oral cavity is limited  by streak artifact from dental hardware.  The nasopharyngeal and  oropharyngeal structures are unremarkable. The hypopharyngeal and  laryngeal structures are unremarkable.      Retropharyngeal and Prevertebral Soft Tissues: Unremarkable.      Lymph nodes: There are few non specific bilateral neck nodes,  probably reactive in etiology. For example, there is a 1.1 cm level  1A lymph node (series 2, image 73). Otherwise, there is no  lymphadenopathy by CT size criteria.      Neck vessels: Bilateral neck vessels are normal in course and caliber  and appear patent.      Thyroid gland: Thyroid gland is not well visualized.      Parotid and submandibular glands: Bilateral parotid and submandibular  glands are unremarkable in appearance.      Paranasal Sinuses and Mastoids: There is mild diffuse paranasal  mucosal thickening. Otherwise, no air-fluid levels to suggest acute  sinusitis. The bilateral mastoid air cells are clear.      Visualized orbital structures are unremarkable.      Visualized upper lungs are clear.      There is mild multilevel discogenic degenerative change within the  cervical spine without significant spinal canal or neural foraminal  stenosis. There is no suspicious osseous lesions or acute osseous  injury. Tissues      There is no identifiable soft tissue mass or irregularity at the  demarcated skin marker.      Impression: 1. No soft tissue mass in the neck. Specifically, no soft tissue mass  lesion is found at the  demarcated skin marker within the right lower  neck.  2. Scattered cervical lymph nodes are visualized which are likely  reactive as described above.      I personally reviewed the images/study and I agree with the findings  as stated above by resident physician, Dr. Sean Forbes. The  study was interpreted at [facility] in [facility].      MACRO:  None      Signed by: Hema Mathis 10/2/2023 9:48 AM  Dictation workstation:   [id number]      ULTRASOUND  DIAGNOSTIC ULTRASOUND REPORT FINAL: Left KNEE  Sonographer: Henry Galvin MD  Indication: Knee Pain  Procedure: Ultrasound, extremity, nonvascular, real-time, COMPLETE, anatomic specific  Technique: B-Mode Ultrasound Examination performed using 6- 9 MHz linear transducer with [employer]  STUDY TYPE:   1. ULTRASOUND EXTREMITY  2. REAL TIME WITH IMAGE DOCUMENTATION  3. NON-VASCULAR  4. COMPLETE STUDY, INCLUDING BUT NOT LIMITED TO MUSCLE, TENDONS, LIGAMENTS, SOFT TISSUES, ADIPOSE TISSUE AND SUBCUTANEOUS TISSUE.  Site: KNEE   Live ultrasound was performed with of patient's  KNEE and PERMANENTLY documented. I personally performed the ultrasound and reviewed the findings. These show:    Destiny-articular evaluation:   An intact Quadriceps Tendon with the Quadriceps Muscle fibers showing normal striations Quadriceps Tendon demonstrating normal fibrillar pattern. . The Patellar Tendon demonstrates normal fibrillar pattern and is intact.   No significant soft tissue fluid collection/abscess appreciated.     Joint Evaluation:  The lateral joint line shows an intact LCL.   Medial joint line exam shows an intact MCL.   The patellar tendon was within normal limits.  Mild joint effusion noted.     The patient tolerated the procedure well.        L Inj/Asp: L knee on 10/31/2023 9:45 AM  Indications: joint swelling and pain  Details: ultrasound-guided superolateral approach  Medications: 2.5 mg triamcinolone acetonide 40  mg/mL  Consent was given by the patient. Immediately prior to procedure a time out was called to verify the correct patient, procedure, equipment, support staff and site/side marked as required. Patient was prepped and draped in the usual sterile fashion.              Orders Placed This Encounter    XR knee left 3 views    Point of Care Ultrasound

## 2023-11-01 ENCOUNTER — HOSPITAL ENCOUNTER (OUTPATIENT)
Dept: RADIOLOGY | Facility: HOSPITAL | Age: 43
Discharge: HOME | End: 2023-11-01
Payer: COMMERCIAL

## 2023-11-01 DIAGNOSIS — G89.29 OTHER CHRONIC PAIN: ICD-10-CM

## 2023-11-01 DIAGNOSIS — M50.30 OTHER CERVICAL DISC DEGENERATION, UNSPECIFIED CERVICAL REGION: ICD-10-CM

## 2023-11-01 DIAGNOSIS — R59.0 LOCALIZED ENLARGED LYMPH NODES: ICD-10-CM

## 2023-11-01 DIAGNOSIS — M54.50 LOW BACK PAIN, UNSPECIFIED: ICD-10-CM

## 2023-11-01 PROCEDURE — 72050 X-RAY EXAM NECK SPINE 4/5VWS: CPT | Performed by: RADIOLOGY

## 2023-11-01 PROCEDURE — 72050 X-RAY EXAM NECK SPINE 4/5VWS: CPT

## 2023-11-01 PROCEDURE — 72110 X-RAY EXAM L-2 SPINE 4/>VWS: CPT

## 2023-11-01 PROCEDURE — 72110 X-RAY EXAM L-2 SPINE 4/>VWS: CPT | Performed by: RADIOLOGY

## 2023-11-03 ENCOUNTER — LAB (OUTPATIENT)
Dept: LAB | Facility: LAB | Age: 43
End: 2023-11-03
Payer: COMMERCIAL

## 2023-11-03 DIAGNOSIS — D64.9 ANEMIA, UNSPECIFIED: Primary | ICD-10-CM

## 2023-11-03 DIAGNOSIS — D64.9 ANEMIA, UNSPECIFIED: ICD-10-CM

## 2023-11-03 LAB
ERYTHROCYTE [DISTWIDTH] IN BLOOD BY AUTOMATED COUNT: 14 % (ref 11.5–14.5)
FERRITIN SERPL-MCNC: 21 NG/ML (ref 8–150)
FOLATE SERPL-MCNC: 14.4 NG/ML
HCT VFR BLD AUTO: 37.7 % (ref 36–46)
HGB BLD-MCNC: 11.8 G/DL (ref 12–16)
IRON SATN MFR SERPL: 7 % (ref 25–45)
IRON SERPL-MCNC: 32 UG/DL (ref 35–150)
MCH RBC QN AUTO: 24.5 PG (ref 26–34)
MCHC RBC AUTO-ENTMCNC: 31.3 G/DL (ref 32–36)
MCV RBC AUTO: 78 FL (ref 80–100)
NRBC BLD-RTO: 0 /100 WBCS (ref 0–0)
PLATELET # BLD AUTO: 415 X10*3/UL (ref 150–450)
RBC # BLD AUTO: 4.81 X10*6/UL (ref 4–5.2)
TIBC SERPL-MCNC: 444 UG/DL (ref 240–445)
UIBC SERPL-MCNC: 412 UG/DL (ref 110–370)
VIT B12 SERPL-MCNC: 250 PG/ML (ref 211–911)
WBC # BLD AUTO: 16.8 X10*3/UL (ref 4.4–11.3)

## 2023-11-03 PROCEDURE — 82746 ASSAY OF FOLIC ACID SERUM: CPT

## 2023-11-03 PROCEDURE — 36415 COLL VENOUS BLD VENIPUNCTURE: CPT

## 2023-11-03 PROCEDURE — 83550 IRON BINDING TEST: CPT

## 2023-11-03 PROCEDURE — 83540 ASSAY OF IRON: CPT

## 2023-11-03 PROCEDURE — 82607 VITAMIN B-12: CPT

## 2023-11-03 PROCEDURE — 82728 ASSAY OF FERRITIN: CPT

## 2023-11-03 PROCEDURE — 85027 COMPLETE CBC AUTOMATED: CPT

## 2023-11-20 ENCOUNTER — EVALUATION (OUTPATIENT)
Dept: PHYSICAL THERAPY | Facility: CLINIC | Age: 43
End: 2023-11-20
Payer: MEDICAID

## 2023-11-20 DIAGNOSIS — M25.562 LEFT KNEE PAIN, UNSPECIFIED CHRONICITY: ICD-10-CM

## 2023-11-20 DIAGNOSIS — M17.12 ARTHRITIS OF KNEE, LEFT: ICD-10-CM

## 2023-11-20 PROCEDURE — 97161 PT EVAL LOW COMPLEX 20 MIN: CPT | Mod: GP

## 2023-11-20 ASSESSMENT — PATIENT HEALTH QUESTIONNAIRE - PHQ9
7. TROUBLE CONCENTRATING ON THINGS, SUCH AS READING THE NEWSPAPER OR WATCHING TELEVISION: NEARLY EVERY DAY
4. FEELING TIRED OR HAVING LITTLE ENERGY: NEARLY EVERY DAY
2. FEELING DOWN, DEPRESSED OR HOPELESS: NEARLY EVERY DAY
9. THOUGHTS THAT YOU WOULD BE BETTER OFF DEAD, OR OF HURTING YOURSELF: MORE THAN HALF THE DAYS
SUM OF ALL RESPONSES TO PHQ QUESTIONS 1-9: 26
10. IF YOU CHECKED OFF ANY PROBLEMS, HOW DIFFICULT HAVE THESE PROBLEMS MADE IT FOR YOU TO DO YOUR WORK, TAKE CARE OF THINGS AT HOME, OR GET ALONG WITH OTHER PEOPLE: EXTREMELY DIFFICULT
2. FEELING DOWN, DEPRESSED OR HOPELESS: NEARLY EVERY DAY
7. TROUBLE CONCENTRATING ON THINGS, SUCH AS READING THE NEWSPAPER OR WATCHING TELEVISION: NEARLY EVERY DAY
5. POOR APPETITE OR OVEREATING: NEARLY EVERY DAY
8. MOVING OR SPEAKING SO SLOWLY THAT OTHER PEOPLE COULD HAVE NOTICED. OR THE OPPOSITE, BEING SO FIGETY OR RESTLESS THAT YOU HAVE BEEN MOVING AROUND A LOT MORE THAN USUAL: NEARLY EVERY DAY
3. TROUBLE FALLING OR STAYING ASLEEP OR SLEEPING TOO MUCH: NEARLY EVERY DAY
1. LITTLE INTEREST OR PLEASURE IN DOING THINGS: NEARLY EVERY DAY
8. MOVING OR SPEAKING SO SLOWLY THAT OTHER PEOPLE COULD HAVE NOTICED. OR THE OPPOSITE, BEING SO FIGETY OR RESTLESS THAT YOU HAVE BEEN MOVING AROUND A LOT MORE THAN USUAL: NEARLY EVERY DAY
4. FEELING TIRED OR HAVING LITTLE ENERGY: NEARLY EVERY DAY
6. FEELING BAD ABOUT YOURSELF - OR THAT YOU ARE A FAILURE OR HAVE LET YOURSELF OR YOUR FAMILY DOWN: NEARLY EVERY DAY
10. IF YOU CHECKED OFF ANY PROBLEMS, HOW DIFFICULT HAVE THESE PROBLEMS MADE IT FOR YOU TO DO YOUR WORK, TAKE CARE OF THINGS AT HOME, OR GET ALONG WITH OTHER PEOPLE: EXTREMELY DIFFICULT
SUM OF ALL RESPONSES TO PHQ9 QUESTIONS 1 AND 2: 6
6. FEELING BAD ABOUT YOURSELF - OR THAT YOU ARE A FAILURE OR HAVE LET YOURSELF OR YOUR FAMILY DOWN: NEARLY EVERY DAY
9. THOUGHTS THAT YOU WOULD BE BETTER OFF DEAD, OR OF HURTING YOURSELF: MORE THAN HALF THE DAYS
1. LITTLE INTEREST OR PLEASURE IN DOING THINGS: NEARLY EVERY DAY
3. TROUBLE FALLING OR STAYING ASLEEP OR SLEEPING TOO MUCH: NEARLY EVERY DAY
SUM OF ALL RESPONSES TO PHQ QUESTIONS 1-9: 26
5. POOR APPETITE OR OVEREATING: NEARLY EVERY DAY
SUM OF ALL RESPONSES TO PHQ9 QUESTIONS 1 AND 2: 6

## 2023-11-20 ASSESSMENT — ENCOUNTER SYMPTOMS
LOSS OF SENSATION IN FEET: 1
OCCASIONAL FEELINGS OF UNSTEADINESS: 1
DEPRESSION: 0

## 2023-11-20 ASSESSMENT — PAIN - FUNCTIONAL ASSESSMENT: PAIN_FUNCTIONAL_ASSESSMENT: 0-10

## 2023-11-20 ASSESSMENT — ACTIVITIES OF DAILY LIVING (ADL): EFFECT OF PAIN ON DAILY ACTIVITIES: SEE GOALS

## 2023-11-20 ASSESSMENT — PAIN SCALES - GENERAL: PAINLEVEL_OUTOF10: 10 - WORST POSSIBLE PAIN

## 2023-11-20 NOTE — PROGRESS NOTES
Patient Name: Maddison Galdamez  MRN: [id number]  Today's Date: 11/20/2023  Time Calculation  Start Time: 0756  Stop Time: 0830  Time Calculation (min): 34 min      Assessment:  Rehab Prognosis: Fair  Chronic L knee pain since 1996.  Per patient there has been multiple surgeries, injections,aand drainage procedures to the L knee.   There is also a HX of L knee PT in the past.  There also has been HX of multiple patellar disloc pr the patient.   Physical findings include L knee limited motion and weakness with pain.   There is also L L>R hip weakness.   Continue with open to closefd chain ROM/PRE as latesha.  Plan:  Treatment/Interventions: Aquatic therapy, Education/ Instruction, Electrical stimulation, Gait training, Hot pack, Manual therapy, Neuromuscular re-education, Self care/ home management, Therapeutic exercises (IASTM)  PT Plan: Skilled PT  PT Frequency: 2 times per week  Duration: 4wks  Onset Date: 11/20/23  Certification Period Start Date: 11/20/23  Certification Period End Date: 02/18/24  Rehab Potential: Fair  Plan of Care Agreement: Patient    Current Problem:   1. Left knee pain, unspecified chronicity  Referral to Physical Therapy      2. Arthritis of knee, left  Referral to Physical Therapy          Subjective    General:  General  Reason for Referral: L knee pain  Referred By: Kamar  Precautions:  Precautions  STEADI Fall Risk Score (The score of 4 or more indicates an increased risk of falling): 8  Precautions Comment: emphysema; OA; osteoporosis; Db/neuro; mod fall risk    Pain:  Pain Assessment  Pain Assessment: 0-10  Pain Score: 10 - Worst possible pain  Pain Location: Knee  Pain Orientation: Left  Effect of Pain on Daily Activities: see goals  Pain Interventions:  (HX x2 knee surg; multip;le injections; fluid drained)    Prior Level of Function:  Prior Function Per Pt/Caregiver Report  Vocational:  (SSD candidate)    Objective     Outcome Measures:  Other Measures  Other Outcome Measures: 9/80 LEFS      Treatments:  Eval only-time    Continue with open to closefd chain ROM/PRE as latesha.  OP EDUCATION:  Outpatient Education  Individual(s) Educated: Patient  Patient/Caregiver Demonstrated Understanding: yes  Plan of Care Discussed and Agreed Upon: yes    Goals:  Active       PT Problem       PT Goal 1       Start:  11/20/23    Expected End:  02/18/24       1. Independent HEP to allow for 50% reduction in max ADL C/C sx ( 10/10) 2-3wks  2. 0/10 night time sx to allow for uninterrupted sleep x 1wk ( 7/7) 2-3wks  3. Survey score improvement from 9/80 to 40/80 (LEFS) 3-4wks  4. ROM increase to allow for ADL car transfers (from   5. Strength increase to allow for improved ADL stairs; STS (from              KNEE      Knee AROM WFL unless documented below  R knee flexion: (140°): 122  L knee flexion: (140°): 95  R knee extension: (0°): neut  L knee extension: (0°): neut    Knee MMT WFL unless documented below  R knee flexion: (5/5): 5/5  L knee flexion: (5/5): 4+/5  R knee extension: (5/5): 5/5  L knee extension: (5/5): 4-/5    Gr4- gross L hip strength; gr4 gross R hip strength

## 2023-11-21 ENCOUNTER — ANCILLARY PROCEDURE (OUTPATIENT)
Dept: RADIOLOGY | Facility: CLINIC | Age: 43
End: 2023-11-21
Payer: MEDICAID

## 2023-11-21 ENCOUNTER — OFFICE VISIT (OUTPATIENT)
Dept: ORTHOPEDIC SURGERY | Facility: CLINIC | Age: 43
End: 2023-11-21
Payer: MEDICAID

## 2023-11-21 DIAGNOSIS — S89.91XA RIGHT KNEE INJURY, INITIAL ENCOUNTER: ICD-10-CM

## 2023-11-21 DIAGNOSIS — M25.561 ACUTE PAIN OF RIGHT KNEE: Primary | ICD-10-CM

## 2023-11-21 DIAGNOSIS — M25.561 ACUTE PAIN OF RIGHT KNEE: ICD-10-CM

## 2023-11-21 DIAGNOSIS — S82.091A OTHER FRACTURE OF RIGHT PATELLA, INITIAL ENCOUNTER FOR CLOSED FRACTURE: ICD-10-CM

## 2023-11-21 DIAGNOSIS — M25.562 LEFT KNEE PAIN, UNSPECIFIED CHRONICITY: ICD-10-CM

## 2023-11-21 PROCEDURE — 73564 X-RAY EXAM KNEE 4 OR MORE: CPT | Mod: RT

## 2023-11-21 PROCEDURE — 1036F TOBACCO NON-USER: CPT | Performed by: NURSE PRACTITIONER

## 2023-11-21 PROCEDURE — 73564 X-RAY EXAM KNEE 4 OR MORE: CPT | Mod: RIGHT SIDE | Performed by: RADIOLOGY

## 2023-11-21 PROCEDURE — 3044F HG A1C LEVEL LT 7.0%: CPT | Performed by: NURSE PRACTITIONER

## 2023-11-21 PROCEDURE — 99214 OFFICE O/P EST MOD 30 MIN: CPT | Performed by: NURSE PRACTITIONER

## 2023-11-21 RX ORDER — MELOXICAM 15 MG/1
7.5 TABLET ORAL DAILY
Qty: 15 TABLET | Refills: 0 | Status: SHIPPED | OUTPATIENT
Start: 2023-11-21 | End: 2023-11-21 | Stop reason: SDUPTHER

## 2023-11-21 RX ORDER — MELOXICAM 15 MG/1
7.5 TABLET ORAL DAILY
Qty: 15 TABLET | Refills: 0 | Status: SHIPPED | OUTPATIENT
Start: 2023-11-21 | End: 2023-12-21

## 2023-11-21 RX ORDER — DESVENLAFAXINE 50 MG/1
25 TABLET, EXTENDED RELEASE ORAL DAILY
COMMUNITY

## 2023-11-21 RX ORDER — KETOROLAC TROMETHAMINE 10 MG/1
10 TABLET, FILM COATED ORAL 3 TIMES DAILY
Qty: 15 TABLET | Refills: 0 | Status: SHIPPED | OUTPATIENT
Start: 2023-11-21 | End: 2023-11-26

## 2023-11-21 ASSESSMENT — ENCOUNTER SYMPTOMS
CARDIOVASCULAR NEGATIVE: 1
NEUROLOGICAL NEGATIVE: 1
ENDOCRINE NEGATIVE: 1
RESPIRATORY NEGATIVE: 1
CONSTITUTIONAL NEGATIVE: 1
HEMATOLOGIC/LYMPHATIC NEGATIVE: 1
MYALGIAS: 1
PSYCHIATRIC NEGATIVE: 1
ARTHRALGIAS: 1

## 2023-11-21 ASSESSMENT — PAIN SCALES - GENERAL: PAINLEVEL_OUTOF10: 8

## 2023-11-21 NOTE — PROGRESS NOTES
"Subjective    Patient ID: Maddison Galdamez is a 43 y.o. female.    Chief Complaint: Pain of the Left Knee (Patient states she has had pain in her left for some time she had an xray on 10/31/2023) and Pain of the Right Knee (Patient states that she has fallen 3 times landing on her right knee and elbow since 10/31/2023, she has not had any xray's of her right knee as of today.)     Hx L knee surgery yrs ago with ORIF    HPI  Falls x 3 Sat-Sun (11/18-11/19/23) \"withdrawing from Pristiq\" being tapered by prescribing provider.   Reports 1 syncopal episode falling onto R knee and then to R elbow onto concrete floor. Resolving abrasion to R elbow, good ROM. Did not seek eval on date of occurrences, this is first eval.   Accompanied by girlfriend for today's visit who contributes to HPI.  Pt and girlfriend have contacted prescribing provider of falls and sx with taper of meds.     Review of Systems   Constitutional: Negative.    HENT: Negative.     Respiratory: Negative.     Cardiovascular: Negative.    Endocrine: Negative.    Musculoskeletal:  Positive for arthralgias and myalgias.   Skin: Negative.    Neurological: Negative.    Hematological: Negative.    Psychiatric/Behavioral: Negative.          Objective   Right Knee Exam     Tenderness   The patient is experiencing tenderness in the lateral joint line and pes anserinus.    Range of Motion   Extension:  10   Flexion:  80     Tests   Travis:  Medial - negative Lateral - negative  Varus: negative Valgus: positive  Lachman:  Anterior - negative      Drawer:  Anterior - negative    Posterior - negative    Other   Erythema: absent  Sensation: normal  Pulse: present  Swelling: mild    Comments:  Mild lateral>medial edema, no visible bruising or discoloration noted. Skin is intact. Distal ROM intact, mild aggravation and crepitus to the knee with performing.  Distal motor and sensory intact, cap refill at 2 seconds.        Image Results:  Independent review of knee x-ray " completed during today's visit.  There is an irregularity of the inferior lateral portion of the patella, likely bipartite patella versus fracture.  No prior imaging for comparison views available.  Collaborated case with Dr. Galvin on date of visit, await radiology report.    MDM:     Chemistry    Lab Results   Component Value Date/Time     10/16/2023 1857    K 4.0 10/16/2023 1857     10/16/2023 1857    CO2 29 10/16/2023 1857    BUN 8 10/16/2023 1857    CREATININE 0.48 (L) 10/16/2023 1857    Lab Results   Component Value Date/Time    CALCIUM 9.3 10/16/2023 1857    ALKPHOS 63 10/16/2023 1857    AST 14 10/16/2023 1857    ALT 16 10/16/2023 1857    BILITOT 0.2 10/16/2023 1857           Assessment/Plan   Encounter Diagnoses:    Problem List Items Addressed This Visit             ICD-10-CM    Acute pain of right knee - Primary M25.561    Relevant Medications    meloxicam (Mobic) 15 mg tablet    Other Relevant Orders    XR knee right 4+ views (Completed)    Follow Up In Orthopaedic Surgery    XR knee right 4+ views    Right knee injury, initial encounter S89.91XA     Patient currently takes Celebrex and states this is not helping improve her knee pain.  Patient has used ketorolac short-term in the past with significant symptom improvement.  Labs reviewed and patient was provided a 5-day course prescription to take as directed, instructed not to take any other NSAIDs while taking this medication.  This once the prescription is complete, patient may resume daily dose of Celebrex as previously prescribed.   Tylenol prn.   Rest, ice, elevate and knee immobilizer with weight bearing activity, off with rest and sleep  Patient may perform gentle range of motion daily as tolerated.  Activity restriction recommended: Weightbearing as tolerated in knee immobilizer  Follow up here 2 weeks with repeat imaging discussed likely, sooner for changes or concerns.    Normal variant of the patella imaging, however will treat with  immobilization and repeat x-ray images in 2 weeks.  Patient did inquire about bracing for her opposite knee at end of visit which she is seeing another provider in this office for will evaluate at next visit.  Patient in agreement with plan of care.    This note was generated using Dragon software.  It may contain errors in wording, punctuation or spelling.          Other Visit Diagnoses         Codes    Other fracture of right patella, initial encounter for closed fracture     S82.091A    Relevant Medications    ketorolac (Toradol) 10 mg tablet    Other Relevant Orders    Follow Up In Orthopaedic Surgery    XR knee right 4+ views

## 2023-11-22 PROBLEM — S89.91XA RIGHT KNEE INJURY, INITIAL ENCOUNTER: Status: ACTIVE | Noted: 2023-11-22

## 2023-11-22 NOTE — ASSESSMENT & PLAN NOTE
Patient currently takes Celebrex and states this is not helping improve her knee pain.  Patient has used ketorolac short-term in the past with significant symptom improvement.  Labs reviewed and patient was provided a 5-day course prescription to take as directed, instructed not to take any other NSAIDs while taking this medication.  This once the prescription is complete, patient may resume daily dose of Celebrex as previously prescribed.   Tylenol prn.   Rest, ice, elevate and knee immobilizer with weight bearing activity, off with rest and sleep  Patient may perform gentle range of motion daily as tolerated.  Activity restriction recommended: Weightbearing as tolerated in knee immobilizer  Follow up here 2 weeks with repeat imaging discussed likely, sooner for changes or concerns.    Normal variant of the patella imaging, however will treat with immobilization and repeat x-ray images in 2 weeks.  Patient did inquire about bracing for her opposite knee at end of visit which she is seeing another provider in this office for will evaluate at next visit.  Patient in agreement with plan of care.    This note was generated using Dragon software.  It may contain errors in wording, punctuation or spelling.

## 2023-11-24 ENCOUNTER — HOSPITAL ENCOUNTER (OUTPATIENT)
Dept: RADIOLOGY | Facility: HOSPITAL | Age: 43
Discharge: HOME | End: 2023-11-24
Payer: MEDICAID

## 2023-11-24 DIAGNOSIS — S82.091A OTHER FRACTURE OF RIGHT PATELLA, INITIAL ENCOUNTER FOR CLOSED FRACTURE: ICD-10-CM

## 2023-11-24 DIAGNOSIS — M25.561 ACUTE PAIN OF RIGHT KNEE: ICD-10-CM

## 2023-11-24 PROCEDURE — 73564 X-RAY EXAM KNEE 4 OR MORE: CPT | Mod: RIGHT SIDE | Performed by: RADIOLOGY

## 2023-11-24 PROCEDURE — 73564 X-RAY EXAM KNEE 4 OR MORE: CPT | Mod: RT

## 2023-11-26 DIAGNOSIS — S82.091A OTHER FRACTURE OF RIGHT PATELLA, INITIAL ENCOUNTER FOR CLOSED FRACTURE: ICD-10-CM

## 2023-11-27 RX ORDER — KETOROLAC TROMETHAMINE 10 MG/1
10 TABLET, FILM COATED ORAL 3 TIMES DAILY
Qty: 15 TABLET | Refills: 0 | OUTPATIENT
Start: 2023-11-27 | End: 2023-12-02

## 2023-12-01 ENCOUNTER — APPOINTMENT (OUTPATIENT)
Dept: PHYSICAL THERAPY | Facility: CLINIC | Age: 43
End: 2023-12-01

## 2023-12-04 ENCOUNTER — APPOINTMENT (OUTPATIENT)
Dept: PHYSICAL THERAPY | Facility: CLINIC | Age: 43
End: 2023-12-04

## 2023-12-05 ENCOUNTER — APPOINTMENT (OUTPATIENT)
Dept: ORTHOPEDIC SURGERY | Facility: CLINIC | Age: 43
End: 2023-12-05
Payer: MEDICAID

## 2023-12-05 ENCOUNTER — OFFICE VISIT (OUTPATIENT)
Dept: ORTHOPEDIC SURGERY | Facility: CLINIC | Age: 43
End: 2023-12-05
Payer: COMMERCIAL

## 2023-12-05 ENCOUNTER — ANCILLARY PROCEDURE (OUTPATIENT)
Dept: RADIOLOGY | Facility: CLINIC | Age: 43
End: 2023-12-05

## 2023-12-05 DIAGNOSIS — M25.561 ACUTE PAIN OF RIGHT KNEE: ICD-10-CM

## 2023-12-05 DIAGNOSIS — S82.091A OTHER FRACTURE OF RIGHT PATELLA, INITIAL ENCOUNTER FOR CLOSED FRACTURE: ICD-10-CM

## 2023-12-05 DIAGNOSIS — S82.091A OTHER FRACTURE OF RIGHT PATELLA, INITIAL ENCOUNTER FOR CLOSED FRACTURE: Primary | ICD-10-CM

## 2023-12-05 PROCEDURE — L1812 KO ELASTIC W/JOINTS PRE OTS: HCPCS | Performed by: NURSE PRACTITIONER

## 2023-12-05 PROCEDURE — 73564 X-RAY EXAM KNEE 4 OR MORE: CPT | Mod: RIGHT SIDE | Performed by: RADIOLOGY

## 2023-12-05 PROCEDURE — 99213 OFFICE O/P EST LOW 20 MIN: CPT | Performed by: NURSE PRACTITIONER

## 2023-12-05 PROCEDURE — 3044F HG A1C LEVEL LT 7.0%: CPT | Performed by: NURSE PRACTITIONER

## 2023-12-05 PROCEDURE — 1036F TOBACCO NON-USER: CPT | Performed by: NURSE PRACTITIONER

## 2023-12-05 PROCEDURE — 73564 X-RAY EXAM KNEE 4 OR MORE: CPT | Mod: RT

## 2023-12-05 ASSESSMENT — ENCOUNTER SYMPTOMS
NEUROLOGICAL NEGATIVE: 1
ENDOCRINE NEGATIVE: 1
CARDIOVASCULAR NEGATIVE: 1
ARTHRALGIAS: 1
MYALGIAS: 1
CONSTITUTIONAL NEGATIVE: 1
HEMATOLOGIC/LYMPHATIC NEGATIVE: 1
PSYCHIATRIC NEGATIVE: 1
RESPIRATORY NEGATIVE: 1

## 2023-12-05 NOTE — ASSESSMENT & PLAN NOTE
Instructed to continue Celebrex dosing for anti-inflammatory effect with food and Tylenol prn per package directions.  Rest, ice, elevate and Hinged knee brace with weight bearing activity, off with rest and sleep  Patient may perform gentle range of motion daily as tolerated.  Activity restriction recommended: Limit any prolonged activity on the leg to allow for rest, avoid aggravating activities  Follow up here 2 weeks with repeat imaging, sooner for changes or concerns.    Patient did ask for Tylenol No. 3 prescription.  I advised we typically do not prescribe controlled substances for this type of injury.  I did reinforce limiting activity to allow for rest and bracing should help symptoms in addition to the anti-inflammatory.   Patient in agreement with plan of care.

## 2023-12-05 NOTE — PROGRESS NOTES
Subjective    Patient ID: Maddison Galdamez is a 43 y.o. female.    Chief Complaint: FUV R knee pain     HPI  Maddison presents today for follow-up of right knee pain after multiple falls and syncopal event. Patient was placed in a knee immobilizer at last visit, patient states she wears it for few hours in the evening, otherwise is not tolerating the brace well.  Symptoms are aggravated the more she is on it and doing things.  Symptoms include increased pain and swelling.  No new injuries. Accompanied by girlfriend for today's visit who contributes to HPI.  Meloxicam with some improvement. Patient reports she had her 2-week follow-up x-rays done a few days after the last appointment and repeated images today.    Review of Systems   Constitutional: Negative.    HENT: Negative.     Respiratory: Negative.     Cardiovascular: Negative.    Endocrine: Negative.    Musculoskeletal:  Positive for arthralgias and myalgias.   Skin: Negative.    Neurological: Negative.    Hematological: Negative.    Psychiatric/Behavioral: Negative.        Objective   Right Knee Exam     Tenderness   The patient is experiencing tenderness in the lateral joint line (Mild medial joint line tenderness).    Range of Motion   Extension:  5   Flexion:  90     Tests   Travis:  Medial - negative Lateral - negative  Varus: negative Valgus: positive  Lachman:  Anterior - negative      Drawer:  Anterior - negative    Posterior - negative    Other   Erythema: absent  Sensation: normal  Pulse: present  Swelling: mild    Comments:  Mild to moderate lateral>medial edema, no visible bruising or discoloration noted. Skin is intact. Distal ROM intact, mild aggravation and crepitus to the knee with performing.  Distal motor and sensory intact, cap refill at 2 seconds.          Image Results:  Independent review of knee x-ray completed during today's visit.  There is an irregularity of the inferior lateral portion of the patella, bipartite patella versus  fracture. There is no obvious callus formation, however there is rotation of images from each encounter.        Assessment/Plan   Encounter Diagnoses:    Problem List Items Addressed This Visit             ICD-10-CM    Acute pain of right knee M25.561     Other Visit Diagnoses         Codes    Other fracture of right patella, initial encounter for closed fracture    -  Primary S82.091A    Relevant Orders    XR knee right 4+ views          Problem List Items Addressed This Visit             ICD-10-CM    Acute pain of right knee M25.561     Other Visit Diagnoses         Codes    Other fracture of right patella, initial encounter for closed fracture    -  Primary S82.091A    Relevant Orders    XR knee right 4+ views          Problem List Items Addressed This Visit             ICD-10-CM    Acute pain of right knee M25.561    Relevant Orders    Follow Up In Orthopaedic Surgery    XR knee right 4+ views     Other Visit Diagnoses         Codes    Other fracture of right patella, initial encounter for closed fracture    -  Primary S82.091A    Relevant Orders    XR knee right 4+ views    XR knee right 4+ views              Instructed to continue Celebrex dosing for anti-inflammatory effect with food and Tylenol prn per package directions.  Rest, ice, elevate and Hinged knee brace with weight bearing activity, off with rest and sleep  Patient may perform gentle range of motion daily as tolerated.  Activity restriction recommended: Limit any prolonged activity on the leg to allow for rest, avoid aggravating activities  Follow up here 2 weeks with repeat imaging, sooner for changes or concerns.    Patient did ask for Tylenol No. 3 prescription.  I advised we typically do not prescribe controlled substances for this type of injury.  I did reinforce limiting activity to allow for rest and bracing should help symptoms in addition to the anti-inflammatory.   Patient in agreement with plan of care.

## 2023-12-11 ENCOUNTER — APPOINTMENT (OUTPATIENT)
Dept: PHYSICAL THERAPY | Facility: CLINIC | Age: 43
End: 2023-12-11

## 2023-12-12 ENCOUNTER — TELEPHONE (OUTPATIENT)
Dept: ORTHOPEDIC SURGERY | Facility: CLINIC | Age: 43
End: 2023-12-12
Payer: COMMERCIAL

## 2023-12-12 NOTE — TELEPHONE ENCOUNTER
PATIENT WAS CALLED IN [facility]. SHE HAD TO GO BACK AND FORTH WITH THE INSURANCE, AND HAVE THE RX TRANSFERRED SEVERAL TIMES BETWEEN PHARMACY'S. SHE WENT TO [facility] WHERE IT WAS SUPPOSED TO END UP AND THEY DO NOT HAVE IT. CAN YOU RE SEND IT TO [facility] IN [place]. PLEASE NOTIFY THE PATIENT WHEN THIS IS COMPLETE OR OF ANY ISSUES DOING SO.

## 2023-12-13 LAB — HOLD SPECIMEN: NORMAL

## 2023-12-18 ENCOUNTER — APPOINTMENT (OUTPATIENT)
Dept: PHYSICAL THERAPY | Facility: CLINIC | Age: 43
End: 2023-12-18

## 2023-12-29 ENCOUNTER — APPOINTMENT (OUTPATIENT)
Dept: PHYSICAL THERAPY | Facility: CLINIC | Age: 43
End: 2023-12-29

## 2024-01-04 ENCOUNTER — LAB (OUTPATIENT)
Dept: LAB | Facility: LAB | Age: 44
End: 2024-01-04
Payer: COMMERCIAL

## 2024-01-04 DIAGNOSIS — E55.9 VITAMIN D DEFICIENCY, UNSPECIFIED: ICD-10-CM

## 2024-01-04 DIAGNOSIS — E89.0 POSTPROCEDURAL HYPOTHYROIDISM: ICD-10-CM

## 2024-01-04 DIAGNOSIS — C73 MALIGNANT NEOPLASM OF THYROID GLAND (MULTI): Primary | ICD-10-CM

## 2024-01-04 DIAGNOSIS — E89.2 POSTPROCEDURAL HYPOPARATHYROIDISM (MULTI): ICD-10-CM

## 2024-01-04 LAB
25(OH)D3 SERPL-MCNC: 44 NG/ML (ref 30–100)
ALBUMIN SERPL BCP-MCNC: 4.1 G/DL (ref 3.4–5)
ALP SERPL-CCNC: 62 U/L (ref 33–110)
ALT SERPL W P-5'-P-CCNC: 22 U/L (ref 7–45)
ANION GAP SERPL CALC-SCNC: 18 MMOL/L (ref 10–20)
AST SERPL W P-5'-P-CCNC: 24 U/L (ref 9–39)
BILIRUB SERPL-MCNC: 0.5 MG/DL (ref 0–1.2)
BUN SERPL-MCNC: 7 MG/DL (ref 6–23)
CA-I BLD-SCNC: 1.16 MMOL/L (ref 1.1–1.33)
CALCIUM SERPL-MCNC: 9.3 MG/DL (ref 8.6–10.3)
CHLORIDE SERPL-SCNC: 98 MMOL/L (ref 98–107)
CO2 SERPL-SCNC: 23 MMOL/L (ref 21–32)
CREAT SERPL-MCNC: 0.44 MG/DL (ref 0.5–1.05)
GFR SERPL CREATININE-BSD FRML MDRD: >90 ML/MIN/1.73M*2
GLUCOSE SERPL-MCNC: 213 MG/DL (ref 74–99)
MAGNESIUM SERPL-MCNC: 1.73 MG/DL (ref 1.6–2.4)
PHOSPHATE SERPL-MCNC: 4.1 MG/DL (ref 2.5–4.9)
POTASSIUM SERPL-SCNC: 4 MMOL/L (ref 3.5–5.3)
PROT SERPL-MCNC: 7 G/DL (ref 6.4–8.2)
PTH-INTACT SERPL-MCNC: 42 PG/ML (ref 18.5–88)
SODIUM SERPL-SCNC: 135 MMOL/L (ref 136–145)
T4 FREE SERPL-MCNC: 0.87 NG/DL (ref 0.61–1.12)
TSH SERPL-ACNC: 0.01 MIU/L (ref 0.44–3.98)

## 2024-01-04 PROCEDURE — 36415 COLL VENOUS BLD VENIPUNCTURE: CPT

## 2024-01-04 PROCEDURE — 82306 VITAMIN D 25 HYDROXY: CPT

## 2024-01-04 PROCEDURE — 80053 COMPREHEN METABOLIC PANEL: CPT

## 2024-01-04 PROCEDURE — 82652 VIT D 1 25-DIHYDROXY: CPT

## 2024-01-04 PROCEDURE — 82330 ASSAY OF CALCIUM: CPT

## 2024-01-04 PROCEDURE — 83735 ASSAY OF MAGNESIUM: CPT

## 2024-01-04 PROCEDURE — 84439 ASSAY OF FREE THYROXINE: CPT

## 2024-01-04 PROCEDURE — 83970 ASSAY OF PARATHORMONE: CPT

## 2024-01-04 PROCEDURE — 86800 THYROGLOBULIN ANTIBODY: CPT

## 2024-01-04 PROCEDURE — 84443 ASSAY THYROID STIM HORMONE: CPT

## 2024-01-04 PROCEDURE — 84432 ASSAY OF THYROGLOBULIN: CPT

## 2024-01-04 PROCEDURE — 84100 ASSAY OF PHOSPHORUS: CPT

## 2024-01-05 ENCOUNTER — DOCUMENTATION (OUTPATIENT)
Dept: PHYSICAL THERAPY | Facility: CLINIC | Age: 44
End: 2024-01-05
Payer: COMMERCIAL

## 2024-01-05 ENCOUNTER — APPOINTMENT (OUTPATIENT)
Dept: PHYSICAL THERAPY | Facility: CLINIC | Age: 44
End: 2024-01-05

## 2024-01-05 ENCOUNTER — LAB (OUTPATIENT)
Dept: LAB | Facility: LAB | Age: 44
End: 2024-01-05
Payer: COMMERCIAL

## 2024-01-05 DIAGNOSIS — E55.9 VITAMIN D DEFICIENCY, UNSPECIFIED: ICD-10-CM

## 2024-01-05 DIAGNOSIS — E89.0 POSTPROCEDURAL HYPOTHYROIDISM: ICD-10-CM

## 2024-01-05 DIAGNOSIS — C73 MALIGNANT NEOPLASM OF THYROID GLAND (MULTI): ICD-10-CM

## 2024-01-05 DIAGNOSIS — E89.2 POSTPROCEDURAL HYPOPARATHYROIDISM (MULTI): ICD-10-CM

## 2024-01-05 LAB
1,25(OH)2D3 SERPL-MCNC: 85 PG/ML (ref 19.9–79.3)
COLLECT DURATION TIME SPEC: 24 HRS
CREAT 24H UR-MCNC: 80 MG/DL (ref 20–320)
CREAT 24H UR-MRATE: 1.28 G/24 H (ref 0.67–1.59)
SPECIMEN VOL 24H UR: 1600 ML
THYROGLOB AB SERPL-ACNC: <0.9 IU/ML (ref 0–4)

## 2024-01-05 PROCEDURE — 82570 ASSAY OF URINE CREATININE: CPT

## 2024-01-05 PROCEDURE — 81050 URINALYSIS VOLUME MEASURE: CPT

## 2024-01-05 PROCEDURE — 82340 ASSAY OF CALCIUM IN URINE: CPT

## 2024-01-05 NOTE — PROGRESS NOTES
Physical Therapy    Discharge Summary    Name: Maddison Galdamez  MRN: [id number]  : 1980  Date: 24    Discharge Summary: PT        Has been seen in clinical physical therapy on  23  eval visit (s); there has been no further visits attended. DC from PT

## 2024-01-06 LAB
CALCIUM (MG/L) IN 24 HOUR URINE: 179 MG/24H (ref 100–300)
CALCIUM 24H UR-MRATE: 11.2 MG/DL
COLLECT DURATION TIME SPEC: 24 HRS
CREAT 24H UR-MCNC: 82.9 MG/DL (ref 20–320)
CREAT 24H UR-MRATE: 1.33 G/24 H (ref 0.67–1.59)
SPECIMEN VOL 24H UR: 1600 ML

## 2024-01-08 ENCOUNTER — APPOINTMENT (OUTPATIENT)
Dept: PHYSICAL THERAPY | Facility: CLINIC | Age: 44
End: 2024-01-08

## 2024-01-10 LAB — THYROGLOB SERPL-MCNC: <0.5 NG/ML (ref 1.3–31.8)

## 2024-03-10 ENCOUNTER — HOSPITAL ENCOUNTER (EMERGENCY)
Facility: HOSPITAL | Age: 44
Discharge: HOME | End: 2024-03-10
Attending: EMERGENCY MEDICINE
Payer: COMMERCIAL

## 2024-03-10 ENCOUNTER — APPOINTMENT (OUTPATIENT)
Dept: RADIOLOGY | Facility: HOSPITAL | Age: 44
End: 2024-03-10
Payer: COMMERCIAL

## 2024-03-10 VITALS
TEMPERATURE: 98.8 F | BODY MASS INDEX: 39.71 KG/M2 | HEIGHT: 67 IN | WEIGHT: 253 LBS | RESPIRATION RATE: 18 BRPM | OXYGEN SATURATION: 96 % | SYSTOLIC BLOOD PRESSURE: 114 MMHG | HEART RATE: 83 BPM | DIASTOLIC BLOOD PRESSURE: 78 MMHG

## 2024-03-10 DIAGNOSIS — S80.01XA CONTUSION OF RIGHT KNEE, INITIAL ENCOUNTER: Primary | ICD-10-CM

## 2024-03-10 DIAGNOSIS — S82.001D CLOSED DISPLACED FRACTURE OF RIGHT PATELLA WITH ROUTINE HEALING, UNSPECIFIED FRACTURE MORPHOLOGY, SUBSEQUENT ENCOUNTER: ICD-10-CM

## 2024-03-10 PROCEDURE — 73562 X-RAY EXAM OF KNEE 3: CPT | Mod: RIGHT SIDE | Performed by: RADIOLOGY

## 2024-03-10 PROCEDURE — 96372 THER/PROPH/DIAG INJ SC/IM: CPT

## 2024-03-10 PROCEDURE — 73562 X-RAY EXAM OF KNEE 3: CPT | Mod: RT

## 2024-03-10 PROCEDURE — 2500000001 HC RX 250 WO HCPCS SELF ADMINISTERED DRUGS (ALT 637 FOR MEDICARE OP): Performed by: EMERGENCY MEDICINE

## 2024-03-10 PROCEDURE — 99283 EMERGENCY DEPT VISIT LOW MDM: CPT

## 2024-03-10 PROCEDURE — 2500000004 HC RX 250 GENERAL PHARMACY W/ HCPCS (ALT 636 FOR OP/ED): Performed by: EMERGENCY MEDICINE

## 2024-03-10 RX ORDER — OXYCODONE AND ACETAMINOPHEN 5; 325 MG/1; MG/1
1 TABLET ORAL ONCE
Status: COMPLETED | OUTPATIENT
Start: 2024-03-10 | End: 2024-03-10

## 2024-03-10 RX ORDER — TRAMADOL HYDROCHLORIDE 50 MG/1
50 TABLET ORAL 4 TIMES DAILY
Qty: 12 TABLET | Refills: 0 | Status: SHIPPED | OUTPATIENT
Start: 2024-03-10 | End: 2024-03-13

## 2024-03-10 RX ORDER — KETOROLAC TROMETHAMINE 30 MG/ML
30 INJECTION, SOLUTION INTRAMUSCULAR; INTRAVENOUS ONCE
Status: COMPLETED | OUTPATIENT
Start: 2024-03-10 | End: 2024-03-10

## 2024-03-10 RX ADMIN — KETOROLAC TROMETHAMINE 30 MG: 30 INJECTION, SOLUTION INTRAMUSCULAR at 11:27

## 2024-03-10 RX ADMIN — OXYCODONE HYDROCHLORIDE AND ACETAMINOPHEN 1 TABLET: 5; 325 TABLET ORAL at 12:24

## 2024-03-10 ASSESSMENT — ENCOUNTER SYMPTOMS
WOUND: 0
ARTHRALGIAS: 1
TREMORS: 0
NAUSEA: 0
NUMBNESS: 0
NECK STIFFNESS: 0
MYALGIAS: 1
NECK PAIN: 0
VOMITING: 0

## 2024-03-10 ASSESSMENT — COLUMBIA-SUICIDE SEVERITY RATING SCALE - C-SSRS
2. HAVE YOU ACTUALLY HAD ANY THOUGHTS OF KILLING YOURSELF?: NO
6. HAVE YOU EVER DONE ANYTHING, STARTED TO DO ANYTHING, OR PREPARED TO DO ANYTHING TO END YOUR LIFE?: NO
1. IN THE PAST MONTH, HAVE YOU WISHED YOU WERE DEAD OR WISHED YOU COULD GO TO SLEEP AND NOT WAKE UP?: NO

## 2024-03-10 ASSESSMENT — PAIN DESCRIPTION - DESCRIPTORS: DESCRIPTORS: ACHING

## 2024-03-10 ASSESSMENT — PAIN DESCRIPTION - LOCATION: LOCATION: KNEE

## 2024-03-10 ASSESSMENT — PAIN - FUNCTIONAL ASSESSMENT: PAIN_FUNCTIONAL_ASSESSMENT: 0-10

## 2024-03-10 ASSESSMENT — PAIN SCALES - GENERAL
PAINLEVEL_OUTOF10: 8
PAINLEVEL_OUTOF10: 10 - WORST POSSIBLE PAIN

## 2024-03-10 ASSESSMENT — PAIN DESCRIPTION - ORIENTATION: ORIENTATION: RIGHT

## 2024-03-10 ASSESSMENT — PAIN DESCRIPTION - PAIN TYPE: TYPE: ACUTE PAIN

## 2024-03-10 NOTE — ED PROVIDER NOTES
Chief Complaint: R KNEE INJURY    Is a 43-year-old female who tripped going up stairs hit her knee on the edge of a step.  This is the same knee that she was recently diagnosed with a patella fracture and being treated conservatively she complains of pain over this patella once again is concerned of possible displacement of the fracture and presents now for evaluation           Review of Systems   HENT: Negative.     Gastrointestinal:  Negative for nausea and vomiting.   Musculoskeletal:  Positive for arthralgias and myalgias. Negative for neck pain and neck stiffness.   Skin:  Negative for wound.   Neurological:  Negative for tremors and numbness.   All other systems reviewed and are negative.       Physical Exam  Constitutional:       Appearance: She is obese.   HENT:      Head: Normocephalic.   Pulmonary:      Effort: Pulmonary effort is normal.   Musculoskeletal:      Right knee: Bony tenderness present. No swelling, deformity, effusion, erythema or ecchymosis. Normal range of motion. Tenderness present.      Left knee: Normal.        Legs:    Skin:     General: Skin is warm and dry.      Capillary Refill: Capillary refill takes less than 2 seconds.      Findings: No bruising, erythema or rash.   Neurological:      General: No focal deficit present.      Mental Status: She is alert and oriented to person, place, and time.      Sensory: No sensory deficit.      Motor: No weakness.          Labs Reviewed - No data to display     XR knee right 3 views   Final Result   No evidence for displaced acute fracture or dislocation. No joint   effusion. No significant interval change in appearance since prior   study.             MACRO:   None        Signed by: Dedrick La 3/10/2024 11:34 AM   Dictation workstation:   [id number]           Procedures     Medical Decision Making  Yi diagnose include patella fracture displaced patella fracture knee contusion ligamentous injury.  Radiographic studies show the previous  patella fracture but without any fragment displacement and no other bony abnormality.  Patient received Toradol IM 60 mg as well as Percocet for pain and an Ace wrap.  Patient will be treated conservatively with Ace wrap ice and Ultram for pain with Ortho follow-up         Diagnoses as of 03/10/24 1218   Contusion of right knee, initial encounter   Closed displaced fracture of right patella with routine healing, unspecified fracture morphology, subsequent encounter                    Nirav Day MD  03/10/24 1218